# Patient Record
Sex: MALE | Race: BLACK OR AFRICAN AMERICAN | Employment: FULL TIME | URBAN - NONMETROPOLITAN AREA
[De-identification: names, ages, dates, MRNs, and addresses within clinical notes are randomized per-mention and may not be internally consistent; named-entity substitution may affect disease eponyms.]

---

## 2024-07-20 ENCOUNTER — HOSPITAL ENCOUNTER (EMERGENCY)
Age: 29
Discharge: HOME | End: 2024-07-20
Attending: EMERGENCY MEDICINE
Payer: COMMERCIAL

## 2024-07-20 VITALS
SYSTOLIC BLOOD PRESSURE: 123 MMHG | TEMPERATURE: 97.6 F | OXYGEN SATURATION: 97 % | DIASTOLIC BLOOD PRESSURE: 79 MMHG | HEART RATE: 72 BPM | RESPIRATION RATE: 16 BRPM | WEIGHT: 141.09 LBS

## 2024-07-20 DIAGNOSIS — H10.31 ACUTE CONJUNCTIVITIS OF RIGHT EYE, UNSPECIFIED ACUTE CONJUNCTIVITIS TYPE: Primary | ICD-10-CM

## 2024-07-20 PROCEDURE — 2500000003 HC RX 250 WO HCPCS

## 2024-07-20 PROCEDURE — 99283 EMERGENCY DEPT VISIT LOW MDM: CPT

## 2024-07-20 RX ORDER — POLYMYXIN B SULFATE AND TRIMETHOPRIM 1; 10000 MG/ML; [USP'U]/ML
1 SOLUTION OPHTHALMIC EVERY 4 HOURS
Qty: 10 ML | Refills: 0 | Status: SHIPPED | OUTPATIENT
Start: 2024-07-20 | End: 2024-07-30

## 2024-07-20 RX ORDER — POLYMYXIN B SULFATE AND TRIMETHOPRIM 1; 10000 MG/ML; [USP'U]/ML
1 SOLUTION OPHTHALMIC
Status: DISCONTINUED | OUTPATIENT
Start: 2024-07-20 | End: 2024-07-20

## 2024-07-20 RX ORDER — PROPARACAINE HYDROCHLORIDE 5 MG/ML
2 SOLUTION/ DROPS OPHTHALMIC ONCE
Status: COMPLETED | OUTPATIENT
Start: 2024-07-20 | End: 2024-07-20

## 2024-07-20 RX ADMIN — PROPARACAINE HYDROCHLORIDE 2 DROP: 5 SOLUTION/ DROPS OPHTHALMIC at 12:04

## 2024-07-20 ASSESSMENT — VISUAL ACUITY
OD: 20/25
OU: 20/30
OS: 20/20

## 2024-07-20 ASSESSMENT — PAIN DESCRIPTION - PAIN TYPE: TYPE: ACUTE PAIN

## 2024-07-20 ASSESSMENT — PAIN - FUNCTIONAL ASSESSMENT: PAIN_FUNCTIONAL_ASSESSMENT: 0-10

## 2024-07-20 ASSESSMENT — PAIN DESCRIPTION - LOCATION: LOCATION: EYE

## 2024-07-20 ASSESSMENT — PAIN SCALES - GENERAL: PAINLEVEL_OUTOF10: 6

## 2024-07-20 ASSESSMENT — PAIN DESCRIPTION - ORIENTATION: ORIENTATION: RIGHT

## 2024-07-20 NOTE — DISCHARGE INSTRUCTIONS
You were seen today in the emergency department due to right eye pain and swelling, during the physical examinations the right eye was red.  He is using the medication also prescribed and if you feel also pain you can take Tylenol 3 times a day.  You can use the hot compression for the eye.  If you feel any pain or any weird sensations in your eye you can come to the emergency as soon as possible.  Please follow-up with ophthalmology in 3 days if the symptoms does not improve. the structures of the ophthalmology consultation is also being provided to the patient.

## 2024-07-20 NOTE — ED PROVIDER NOTES
PATIENT NAME: Terry Sanders  MRN: [id number]  : 1995  [place]: 2024    I performed a history and physical examination of the patient and discussed management with the Resident. I reviewed the Resident's note and agree with the documented findings and plan of care. Any areas of disagreement are noted on the chart. I was personally present for the key portions of any procedures and have documented in the chart those procedures where I was not present during the key portions. I have reviewed the emergency nurses triage note and agree with the chief complaint, past medical history, past surgical history, allergies, medications, social and family history as documented unless otherwise noted below.    MEDICAL DEDISION MAKING (MDM)     Terry Sanders is a 29 y.o. male who presents to Emergency Department with Eye Problem     R eye pain and swelling x 5 days. New problem. No eye injury. No blurry vision.  No headache.   Not using contact lens.    Physical exam: AVSS.  Heart and lungs unremarkable.  Soft abdomen without tenderness.  Mild right upper and lower eyelid swelling. Right eye has conjunctival injection mainly from palpebral conjunctive and a small part of the bulbar conjunctiva.  Direct and consensual pupil reflexes normal to right side.  No cornea cloudiness.  EOMI.  Fluorescein stain does not show cornea abrasion or other abnormalities    Patient's history, and physical exam suggest conjunctivitis.  This could be viral versus bacterial.  No suspicion patient has orbital cellulitis. I recommend a course of Polytrim eyedrops.  Patient is discharged with ophthalmology follow-up in 3 days.       Vitals:    24 0949   BP: 123/79   Pulse: 72   Resp: 16   Temp: 97.6 °F (36.4 °C)   TempSrc: Oral   SpO2: 97%   Weight: 64 kg (141 lb 1.5 oz)     Labs Reviewed - No data to display  No orders to display     Medications   proparacaine (ALCAINE) 0.5 % ophthalmic solution 2 drop (2 drops Right Eye Given 24

## 2024-07-20 NOTE — ED PROVIDER NOTES
ALLERGIES   No Known Allergies      FAMILY HISTORY   No family history on file.      PHYSICAL EXAM     ED Triage Vitals [07/20/24 0949]   BP Temp Temp Source Pulse Respirations SpO2 Height Weight - Scale   123/79 97.6 °F (36.4 °C) Oral 72 16 97 % -- 64 kg (141 lb 1.5 oz)     Initial vital signs and nursing assessment reviewed and normal. There is no height or weight on file to calculate BMI.     Additional Vital Signs:  Vitals:    07/20/24 0949   BP: 123/79   Pulse: 72   Resp: 16   Temp: 97.6 °F (36.4 °C)   SpO2: 97%       Physical Exam  Constitutional:       Appearance: Normal appearance.   HENT:      Head: Normocephalic and atraumatic.      Mouth/Throat:      Lips: Pink.      Mouth: Mucous membranes are moist.      Pharynx: Oropharynx is clear.   Eyes:      General: Lids are everted, no foreign bodies appreciated.         Right eye: Hordeolum present.      Extraocular Movements: Extraocular movements intact.      Conjunctiva/sclera:      Right eye: Right conjunctiva is injected.      Pupils: Pupils are equal, round, and reactive to light.        Comments: Superficial swelling and swelling of the right lateral eyelid.  Sclera is red   Cardiovascular:      Rate and Rhythm: Normal rate and regular rhythm.      Pulses: Normal pulses.      Heart sounds: Normal heart sounds.   Pulmonary:      Effort: Pulmonary effort is normal.      Breath sounds: Normal breath sounds.   Abdominal:      General: Abdomen is flat. Bowel sounds are normal.      Palpations: Abdomen is soft.   Musculoskeletal:         General: Normal range of motion.      Cervical back: Normal range of motion and neck supple.   Skin:     General: Skin is warm and dry.      Capillary Refill: Capillary refill takes less than 2 seconds.   Neurological:      General: No focal deficit present.      Mental Status: He is alert and oriented to person, place, and time.   Psychiatric:         Mood and Affect: Mood normal.         Behavior: Behavior normal.  department from home as a walk-in for evaluation of right eye swelling at the lateral corner. He reports a pain level of 6 out of 10, which is constant, with no radiation, and accompanied by itching and burning sensations. The patient also mentions a mild headache related to the eye. He states there was no known injury, and the swelling has increased over the past 5 days. He recently traveled to [place] on July 7 and denies being bitten by any insects. He is sexually active with one female partner who does not have any similar lesions. He reports no unusual rash or lesions on his abdomen, chest, back, face, upper extremities, or lower extremities, and no joint pain or urinary discharge.  During the physical examinations the patient is alert normal appearance head is normocephalic and atraumatic, mouth is moist and throat is clear, eye range of motion is normal, PERRL, extraocular movement of the eye intact, ejective and sclera is erythematous and injected, neck range of motion is normal cardiovascular examination shows normal rate heart sounds within normal no murmurs gallops or friction rub.  Pulmonary examination shows normal effort, auscultation of the lung breath sounds normal, abdominal examination shows soft and flat abdomen bowel sound normal no distention or tenderness due to the palpation.  Musculoskeletal examination shows range of motion abnormal, no joint pain, skin exam is warm, dry, no bruising, no rash, capillary refills less than 2-second.  Neurologic examination shows no focal deficit, oriented to 3, behavioral and mood within normal limit.   During the fluorescein examinations the right eyes shows no foreign body no corneal abrasion no laceration no ulcer.  The left eyes shows no abnormality.  After the discussion the results of the fluorescein test and full physical examinations of the eye and the result discussed with the patient during the  the patient discharged home with antibiotic

## 2024-07-20 NOTE — ED NOTES
Patient presents to ED with c/o right eye swelling and pain. Pt states no known injury, swelling has increased for the past five days.